# Patient Record
Sex: MALE | Race: BLACK OR AFRICAN AMERICAN | NOT HISPANIC OR LATINO | URBAN - METROPOLITAN AREA
[De-identification: names, ages, dates, MRNs, and addresses within clinical notes are randomized per-mention and may not be internally consistent; named-entity substitution may affect disease eponyms.]

---

## 2020-06-18 ENCOUNTER — EMERGENCY (EMERGENCY)
Facility: HOSPITAL | Age: 45
LOS: 1 days | Discharge: HOME | End: 2020-06-18
Admitting: EMERGENCY MEDICINE
Payer: MEDICARE

## 2020-06-18 VITALS
HEART RATE: 68 BPM | RESPIRATION RATE: 18 BRPM | WEIGHT: 175.05 LBS | DIASTOLIC BLOOD PRESSURE: 75 MMHG | OXYGEN SATURATION: 98 % | SYSTOLIC BLOOD PRESSURE: 117 MMHG | TEMPERATURE: 98 F

## 2020-06-18 VITALS
HEART RATE: 70 BPM | DIASTOLIC BLOOD PRESSURE: 66 MMHG | TEMPERATURE: 98 F | RESPIRATION RATE: 18 BRPM | SYSTOLIC BLOOD PRESSURE: 115 MMHG | OXYGEN SATURATION: 99 % | WEIGHT: 175.05 LBS

## 2020-06-18 DIAGNOSIS — M79.671 PAIN IN RIGHT FOOT: ICD-10-CM

## 2020-06-18 PROCEDURE — 99283 EMERGENCY DEPT VISIT LOW MDM: CPT

## 2020-06-18 RX ORDER — ACETAMINOPHEN 500 MG
650 TABLET ORAL ONCE
Refills: 0 | Status: COMPLETED | OUTPATIENT
Start: 2020-06-18 | End: 2020-06-18

## 2020-06-18 RX ORDER — CEPHALEXIN 500 MG
500 CAPSULE ORAL ONCE
Refills: 0 | Status: COMPLETED | OUTPATIENT
Start: 2020-06-18 | End: 2020-06-18

## 2020-06-18 RX ORDER — CEPHALEXIN 500 MG
1 CAPSULE ORAL
Qty: 40 | Refills: 0
Start: 2020-06-18 | End: 2020-06-27

## 2020-06-18 RX ADMIN — Medication 650 MILLIGRAM(S): at 09:42

## 2020-06-18 RX ADMIN — Medication 500 MILLIGRAM(S): at 09:42

## 2020-06-18 NOTE — ED PROVIDER NOTE - CLINICAL SUMMARY MEDICAL DECISION MAKING FREE TEXT BOX
43 y/o male here with multiple medical complaints. Asking for food  Exam: well healing wound on right ankle  Plan: will give ortho and podiatry follow up   abx outpatient for possible beginnings of cellulitis

## 2020-06-18 NOTE — ED PROVIDER NOTE - PATIENT PORTAL LINK FT
You can access the FollowMyHealth Patient Portal offered by [facility] by registering at the following website: [email or web address]. By joining [employer]’s FollowMyHealth portal, you will also be able to view your health information using other applications (apps) compatible with our system.

## 2020-06-18 NOTE — ED PROVIDER NOTE - PHYSICAL EXAMINATION
Patient has 4x4 well healing wound on right lateral base of ankle  no signs of drainage.  minimal erythema.

## 2020-06-18 NOTE — ED PROVIDER NOTE - PATIENT PORTAL LINK FT
You can access the FollowMyHealth Patient Portal offered by [facility] by registering at the following website: [email or web address]. By joining [employer]’s FollowMyHealth portal, you will also be able to view your health information using other applications (apps) compatible with our system.

## 2020-06-18 NOTE — ED PROVIDER NOTE - OBJECTIVE STATEMENT
45 y/o male hx of schizophrenia now here to the ED c/o right foot pain. Patient is a very poor historian and will not provide appropriate history. Patient can ambulate without assistance. Patient appears well NAD

## 2020-06-18 NOTE — ED PROVIDER NOTE - CARE PROVIDER_API CALL
Calixto Dumont)  Orthopedics  [address] [facility] [place] [address]  Phone: [phone]  Fax: [phone]  Follow Up Time: 1-3 Days

## 2020-06-18 NOTE — ED PROVIDER NOTE - OBJECTIVE STATEMENT
43 y/o male denies hx now here with a right foot wound states wound is painful. Also c/o chronic lumbar back pain with sciatica. Patient denies chest pain,nausea,vomiting,diarrhea,fevers,chills,sob,trauma,loc. Denies urinary retention,saddle anesthesia and weakness. Patient states he has had the wound on his foot for a while and wants it looked at. denies drainage, abscess,and redness. Patient appears well NAD. Patient has poor hygiene and multiple medical complaints.

## 2020-06-18 NOTE — ED PROVIDER NOTE - PHYSICAL EXAMINATION
Patient not cooperative with exam  Patient has chronic right wound well appearing. No obvious signs of trauma
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
## 2020-06-22 DIAGNOSIS — S91.001D UNSPECIFIED OPEN WOUND, RIGHT ANKLE, SUBSEQUENT ENCOUNTER: ICD-10-CM

## 2020-06-22 DIAGNOSIS — M54.40 LUMBAGO WITH SCIATICA, UNSPECIFIED SIDE: ICD-10-CM

## 2020-06-22 DIAGNOSIS — F17.200 NICOTINE DEPENDENCE, UNSPECIFIED, UNCOMPLICATED: ICD-10-CM

## 2025-01-22 NOTE — ED ADULT NURSE NOTE - NS ED NURSE RECORD ANOTHER VITAL SIGN
Quality 130: Documentation Of Current Medications In The Medical Record: Current Medications Documented Detail Level: Detailed Quality 226: Preventive Care And Screening: Tobacco Use: Screening And Cessation Intervention: Patient screened for tobacco use and is an ex/non-smoker Yes